# Patient Record
Sex: FEMALE | Race: WHITE | NOT HISPANIC OR LATINO
[De-identification: names, ages, dates, MRNs, and addresses within clinical notes are randomized per-mention and may not be internally consistent; named-entity substitution may affect disease eponyms.]

---

## 2017-07-08 ENCOUNTER — HOSPITAL (OUTPATIENT)
Dept: OTHER | Age: 47
End: 2017-07-08
Attending: EMERGENCY MEDICINE

## 2017-07-08 LAB
ALBUMIN SERPL-MCNC: 3.9 GM/DL (ref 3.6–5.1)
ALBUMIN/GLOB SERPL: 1.1 {RATIO} (ref 1–2.4)
ALP SERPL-CCNC: 67 UNIT/L (ref 45–117)
ALT SERPL-CCNC: 23 UNIT/L
ANALYZER ANC (IANC): NORMAL
ANION GAP SERPL CALC-SCNC: 11 MMOL/L (ref 10–20)
AST SERPL-CCNC: 17 UNIT/L
BASOPHILS # BLD: 0 THOUSAND/MCL (ref 0–0.3)
BASOPHILS NFR BLD: 1 %
BILIRUB SERPL-MCNC: 0.5 MG/DL (ref 0.2–1)
BUN SERPL-MCNC: 16 MG/DL (ref 6–20)
BUN/CREAT SERPL: 28 (ref 7–25)
CALCIUM SERPL-MCNC: 8.9 MG/DL (ref 8.4–10.2)
CHLORIDE: 106 MMOL/L (ref 98–107)
CO2 SERPL-SCNC: 29 MMOL/L (ref 21–32)
CREAT SERPL-MCNC: 0.58 MG/DL (ref 0.51–0.95)
DIFFERENTIAL METHOD BLD: NORMAL
EOSINOPHIL # BLD: 0.1 THOUSAND/MCL (ref 0.1–0.5)
EOSINOPHIL NFR BLD: 2 %
ERYTHROCYTE [DISTWIDTH] IN BLOOD: 12.9 % (ref 11–15)
ERYTHROCYTE [SEDIMENTATION RATE] IN BLOOD BY WESTERGREN METHOD: 8 MM/HR (ref 0–20)
GLOBULIN SER-MCNC: 3.5 GM/DL (ref 2–4)
GLUCOSE SERPL-MCNC: 94 MG/DL (ref 65–99)
HEMATOCRIT: 40.2 % (ref 36–46.5)
HGB BLD-MCNC: 13.8 GM/DL (ref 12–15.5)
LIPASE SERPL-CCNC: 204 UNIT/L (ref 73–393)
LYMPHOCYTES # BLD: 1.4 THOUSAND/MCL (ref 1–4.8)
LYMPHOCYTES NFR BLD: 24 %
MCH RBC QN AUTO: 29.2 PG (ref 26–34)
MCHC RBC AUTO-ENTMCNC: 34.3 GM/DL (ref 32–36.5)
MCV RBC AUTO: 85.2 FL (ref 78–100)
MONOCYTES # BLD: 0.4 THOUSAND/MCL (ref 0.3–0.9)
MONOCYTES NFR BLD: 7 %
NEUTROPHILS # BLD: 3.9 THOUSAND/MCL (ref 1.8–7.7)
NEUTROPHILS NFR BLD: 66 %
NEUTS SEG NFR BLD: NORMAL %
PERCENT NRBC: NORMAL
PLATELET # BLD: 284 THOUSAND/MCL (ref 140–450)
POTASSIUM SERPL-SCNC: 4.2 MMOL/L (ref 3.4–5.1)
PROT SERPL-MCNC: 7.4 GM/DL (ref 6.4–8.2)
RBC # BLD: 4.72 MILLION/MCL (ref 4–5.2)
SODIUM SERPL-SCNC: 142 MMOL/L (ref 135–145)
WBC # BLD: 5.8 THOUSAND/MCL (ref 4.2–11)

## 2020-01-18 ENCOUNTER — APPOINTMENT (OUTPATIENT)
Dept: GENERAL RADIOLOGY | Age: 50
DRG: 863 | End: 2020-01-18
Attending: EMERGENCY MEDICINE
Payer: COMMERCIAL

## 2020-01-18 ENCOUNTER — APPOINTMENT (OUTPATIENT)
Dept: CT IMAGING | Age: 50
DRG: 863 | End: 2020-01-18
Attending: EMERGENCY MEDICINE
Payer: COMMERCIAL

## 2020-01-18 ENCOUNTER — HOSPITAL ENCOUNTER (INPATIENT)
Age: 50
LOS: 1 days | Discharge: HOME | DRG: 863 | End: 2020-01-20
Attending: EMERGENCY MEDICINE | Admitting: SURGERY
Payer: COMMERCIAL

## 2020-01-18 DIAGNOSIS — T81.40XA POSTOPERATIVE INFECTION, UNSPECIFIED TYPE, INITIAL ENCOUNTER: Primary | ICD-10-CM

## 2020-01-18 LAB
ALBUMIN SERPL-MCNC: 2.7 G/DL (ref 3.5–5)
ALBUMIN/GLOB SERPL: 0.6 {RATIO} (ref 1.1–2.2)
ALP SERPL-CCNC: 152 U/L (ref 45–117)
ALT SERPL-CCNC: 129 U/L (ref 12–78)
ANION GAP SERPL CALC-SCNC: 6 MMOL/L (ref 5–15)
AST SERPL-CCNC: 60 U/L (ref 15–37)
BASOPHILS # BLD: 0 K/UL (ref 0–0.1)
BASOPHILS NFR BLD: 0 % (ref 0–1)
BILIRUB SERPL-MCNC: 0.8 MG/DL (ref 0.2–1)
BUN SERPL-MCNC: 8 MG/DL (ref 6–20)
BUN/CREAT SERPL: 13 (ref 12–20)
CALCIUM SERPL-MCNC: 8.5 MG/DL (ref 8.5–10.1)
CHLORIDE SERPL-SCNC: 105 MMOL/L (ref 97–108)
CO2 SERPL-SCNC: 25 MMOL/L (ref 21–32)
COMMENT, HOLDF: NORMAL
CREAT SERPL-MCNC: 0.62 MG/DL (ref 0.55–1.02)
DIFFERENTIAL METHOD BLD: ABNORMAL
EOSINOPHIL # BLD: 0.2 K/UL (ref 0–0.4)
EOSINOPHIL NFR BLD: 1 % (ref 0–7)
ERYTHROCYTE [DISTWIDTH] IN BLOOD BY AUTOMATED COUNT: 12.8 % (ref 11.5–14.5)
GLOBULIN SER CALC-MCNC: 4.4 G/DL (ref 2–4)
GLUCOSE SERPL-MCNC: 116 MG/DL (ref 65–100)
HCT VFR BLD AUTO: 35.4 % (ref 35–47)
HGB BLD-MCNC: 11.5 G/DL (ref 11.5–16)
IMM GRANULOCYTES # BLD AUTO: 0.1 K/UL (ref 0–0.04)
IMM GRANULOCYTES NFR BLD AUTO: 1 % (ref 0–0.5)
LACTATE SERPL-SCNC: 1.2 MMOL/L (ref 0.4–2)
LYMPHOCYTES # BLD: 0.9 K/UL (ref 0.8–3.5)
LYMPHOCYTES NFR BLD: 7 % (ref 12–49)
MCH RBC QN AUTO: 28.8 PG (ref 26–34)
MCHC RBC AUTO-ENTMCNC: 32.5 G/DL (ref 30–36.5)
MCV RBC AUTO: 88.5 FL (ref 80–99)
MONOCYTES # BLD: 0.5 K/UL (ref 0–1)
MONOCYTES NFR BLD: 5 % (ref 5–13)
NEUTS SEG # BLD: 10 K/UL (ref 1.8–8)
NEUTS SEG NFR BLD: 86 % (ref 32–75)
NRBC # BLD: 0 K/UL (ref 0–0.01)
NRBC BLD-RTO: 0 PER 100 WBC
PLATELET # BLD AUTO: 282 K/UL (ref 150–400)
PMV BLD AUTO: 9.7 FL (ref 8.9–12.9)
POTASSIUM SERPL-SCNC: 3.3 MMOL/L (ref 3.5–5.1)
PROT SERPL-MCNC: 7.1 G/DL (ref 6.4–8.2)
RBC # BLD AUTO: 4 M/UL (ref 3.8–5.2)
SAMPLES BEING HELD,HOLD: NORMAL
SODIUM SERPL-SCNC: 136 MMOL/L (ref 136–145)
WBC # BLD AUTO: 11.7 K/UL (ref 3.6–11)

## 2020-01-18 PROCEDURE — 93005 ELECTROCARDIOGRAM TRACING: CPT

## 2020-01-18 PROCEDURE — 85025 COMPLETE CBC W/AUTO DIFF WBC: CPT

## 2020-01-18 PROCEDURE — 74177 CT ABD & PELVIS W/CONTRAST: CPT

## 2020-01-18 PROCEDURE — 36415 COLL VENOUS BLD VENIPUNCTURE: CPT

## 2020-01-18 PROCEDURE — 80053 COMPREHEN METABOLIC PANEL: CPT

## 2020-01-18 PROCEDURE — 83605 ASSAY OF LACTIC ACID: CPT

## 2020-01-18 PROCEDURE — 87040 BLOOD CULTURE FOR BACTERIA: CPT

## 2020-01-18 PROCEDURE — 71045 X-RAY EXAM CHEST 1 VIEW: CPT

## 2020-01-18 PROCEDURE — 74011636320 HC RX REV CODE- 636/320: Performed by: RADIOLOGY

## 2020-01-18 PROCEDURE — 74011250636 HC RX REV CODE- 250/636: Performed by: EMERGENCY MEDICINE

## 2020-01-18 PROCEDURE — 74011000258 HC RX REV CODE- 258: Performed by: RADIOLOGY

## 2020-01-18 PROCEDURE — 99285 EMERGENCY DEPT VISIT HI MDM: CPT

## 2020-01-18 RX ORDER — SODIUM CHLORIDE 0.9 % (FLUSH) 0.9 %
10 SYRINGE (ML) INJECTION
Status: COMPLETED | OUTPATIENT
Start: 2020-01-18 | End: 2020-01-18

## 2020-01-18 RX ADMIN — Medication 10 ML: at 23:44

## 2020-01-18 RX ADMIN — SODIUM CHLORIDE 100 ML: 900 INJECTION, SOLUTION INTRAVENOUS at 23:44

## 2020-01-18 RX ADMIN — IOPAMIDOL 100 ML: 755 INJECTION, SOLUTION INTRAVENOUS at 23:44

## 2020-01-18 RX ADMIN — SODIUM CHLORIDE 1000 ML: 900 INJECTION, SOLUTION INTRAVENOUS at 22:58

## 2020-01-19 PROBLEM — L03.311 CELLULITIS OF ABDOMINAL WALL: Status: ACTIVE | Noted: 2020-01-19

## 2020-01-19 LAB
ATRIAL RATE: 102 BPM
CALCULATED P AXIS, ECG09: 37 DEGREES
CALCULATED R AXIS, ECG10: 49 DEGREES
CALCULATED T AXIS, ECG11: 17 DEGREES
DIAGNOSIS, 93000: NORMAL
P-R INTERVAL, ECG05: 166 MS
Q-T INTERVAL, ECG07: 340 MS
QRS DURATION, ECG06: 94 MS
QTC CALCULATION (BEZET), ECG08: 443 MS
VANCOMYCIN SERPL-MCNC: 2 UG/ML
VENTRICULAR RATE, ECG03: 102 BPM

## 2020-01-19 PROCEDURE — 36416 COLLJ CAPILLARY BLOOD SPEC: CPT

## 2020-01-19 PROCEDURE — 74011000258 HC RX REV CODE- 258: Performed by: SURGERY

## 2020-01-19 PROCEDURE — 74011250637 HC RX REV CODE- 250/637: Performed by: SURGERY

## 2020-01-19 PROCEDURE — 80202 ASSAY OF VANCOMYCIN: CPT

## 2020-01-19 PROCEDURE — 74011250637 HC RX REV CODE- 250/637: Performed by: PLASTIC SURGERY

## 2020-01-19 PROCEDURE — 87077 CULTURE AEROBIC IDENTIFY: CPT

## 2020-01-19 PROCEDURE — 87186 SC STD MICRODIL/AGAR DIL: CPT

## 2020-01-19 PROCEDURE — 77030036554

## 2020-01-19 PROCEDURE — 87015 SPECIMEN INFECT AGNT CONCNTJ: CPT

## 2020-01-19 PROCEDURE — 74011250637 HC RX REV CODE- 250/637: Performed by: EMERGENCY MEDICINE

## 2020-01-19 PROCEDURE — 87147 CULTURE TYPE IMMUNOLOGIC: CPT

## 2020-01-19 PROCEDURE — 74011250636 HC RX REV CODE- 250/636: Performed by: SURGERY

## 2020-01-19 PROCEDURE — 65410000002 HC RM PRIVATE OB

## 2020-01-19 PROCEDURE — 77030040361 HC SLV COMPR DVT MDII -B

## 2020-01-19 PROCEDURE — 87205 SMEAR GRAM STAIN: CPT

## 2020-01-19 RX ORDER — DEXTROSE, SODIUM CHLORIDE, AND POTASSIUM CHLORIDE 5; .45; .15 G/100ML; G/100ML; G/100ML
125 INJECTION INTRAVENOUS CONTINUOUS
Status: DISCONTINUED | OUTPATIENT
Start: 2020-01-19 | End: 2020-01-20 | Stop reason: HOSPADM

## 2020-01-19 RX ORDER — VANCOMYCIN 2 GRAM/500 ML IN 0.9 % SODIUM CHLORIDE INTRAVENOUS
2000 ONCE
Status: COMPLETED | OUTPATIENT
Start: 2020-01-19 | End: 2020-01-19

## 2020-01-19 RX ORDER — SODIUM CHLORIDE 0.9 % (FLUSH) 0.9 %
5-40 SYRINGE (ML) INJECTION EVERY 8 HOURS
Status: DISCONTINUED | OUTPATIENT
Start: 2020-01-19 | End: 2020-01-20 | Stop reason: HOSPADM

## 2020-01-19 RX ORDER — OXYCODONE AND ACETAMINOPHEN 5; 325 MG/1; MG/1
1 TABLET ORAL
Status: DISCONTINUED | OUTPATIENT
Start: 2020-01-19 | End: 2020-01-20 | Stop reason: HOSPADM

## 2020-01-19 RX ORDER — SODIUM CHLORIDE 0.9 % (FLUSH) 0.9 %
5-40 SYRINGE (ML) INJECTION AS NEEDED
Status: DISCONTINUED | OUTPATIENT
Start: 2020-01-19 | End: 2020-01-20 | Stop reason: HOSPADM

## 2020-01-19 RX ORDER — ACETAMINOPHEN 325 MG/1
650 TABLET ORAL
Status: DISCONTINUED | OUTPATIENT
Start: 2020-01-19 | End: 2020-01-20 | Stop reason: HOSPADM

## 2020-01-19 RX ORDER — VANCOMYCIN 1.75 GRAM/500 ML IN 0.9 % SODIUM CHLORIDE INTRAVENOUS
1750 EVERY 12 HOURS
Status: DISCONTINUED | OUTPATIENT
Start: 2020-01-19 | End: 2020-01-20

## 2020-01-19 RX ADMIN — Medication 10 ML: at 21:07

## 2020-01-19 RX ADMIN — VANCOMYCIN HYDROCHLORIDE 1750 MG: 10 INJECTION, POWDER, LYOPHILIZED, FOR SOLUTION INTRAVENOUS at 20:36

## 2020-01-19 RX ADMIN — PIPERACILLIN AND TAZOBACTAM 3.38 G: 3; .375 INJECTION, POWDER, LYOPHILIZED, FOR SOLUTION INTRAVENOUS at 02:47

## 2020-01-19 RX ADMIN — OXYCODONE HYDROCHLORIDE AND ACETAMINOPHEN 1 TABLET: 5; 325 TABLET ORAL at 21:00

## 2020-01-19 RX ADMIN — OXYCODONE HYDROCHLORIDE AND ACETAMINOPHEN 1 TABLET: 5; 325 TABLET ORAL at 13:23

## 2020-01-19 RX ADMIN — PIPERACILLIN AND TAZOBACTAM 3.38 G: 3; .375 INJECTION, POWDER, LYOPHILIZED, FOR SOLUTION INTRAVENOUS at 11:30

## 2020-01-19 RX ADMIN — ACETAMINOPHEN 650 MG: 325 TABLET ORAL at 08:15

## 2020-01-19 RX ADMIN — PIPERACILLIN AND TAZOBACTAM 3.38 G: 3; .375 INJECTION, POWDER, LYOPHILIZED, FOR SOLUTION INTRAVENOUS at 18:49

## 2020-01-19 RX ADMIN — VANCOMYCIN HYDROCHLORIDE 2000 MG: 10 INJECTION, POWDER, LYOPHILIZED, FOR SOLUTION INTRAVENOUS at 08:04

## 2020-01-19 RX ADMIN — DEXTROSE MONOHYDRATE, SODIUM CHLORIDE, AND POTASSIUM CHLORIDE 125 ML/HR: 50; 4.5; 1.49 INJECTION, SOLUTION INTRAVENOUS at 19:46

## 2020-01-19 RX ADMIN — OXYCODONE HYDROCHLORIDE AND ACETAMINOPHEN 1 TABLET: 5; 325 TABLET ORAL at 02:22

## 2020-01-19 RX ADMIN — LORATADINE, PSEUDOEPHEDRINE SULFATE 1 TABLET: 5; 120 TABLET, FILM COATED, EXTENDED RELEASE ORAL at 02:22

## 2020-01-19 RX ADMIN — DEXTROSE MONOHYDRATE, SODIUM CHLORIDE, AND POTASSIUM CHLORIDE 125 ML/HR: 50; 4.5; 1.49 INJECTION, SOLUTION INTRAVENOUS at 01:49

## 2020-01-19 RX ADMIN — Medication 20 ML: at 16:30

## 2020-01-19 NOTE — PROGRESS NOTES
1546 Bedside and Verbal shift change report given to Danna Meza RN (oncoming nurse) by Wen Morris RN (offgoing nurse). Report included the following information SBAR, Kardex, Procedure Summary, Intake/Output, MAR, Accordion, Recent Results and Med Rec Status.        18 Called ID to provide [facility] MD phone number of [phone] per patient's request.

## 2020-01-19 NOTE — PROGRESS NOTES
Problem: Falls - Risk of  Goal: *Absence of Falls  Description  Document Wil Luciano Fall Risk and appropriate interventions in the flowsheet. Outcome: Progressing Towards Goal  Note: Fall Risk Interventions:            Medication Interventions: Teach patient to arise slowly, Patient to call before getting OOB                   Problem: Patient Education: Go to Patient Education Activity  Goal: Patient/Family Education  Outcome: Progressing Towards Goal     Problem: Pain  Goal: *Control of Pain  Outcome: Progressing Towards Goal     Problem: Patient Education: Go to Patient Education Activity  Goal: Patient/Family Education  Outcome: Progressing Towards Goal     Problem: Pressure Injury - Risk of  Goal: *Prevention of pressure injury  Description  Document Aleksey Scale and appropriate interventions in the flowsheet. Outcome: Progressing Towards Goal  Note: Pressure Injury Interventions:             Activity Interventions: Increase time out of bed         Nutrition Interventions: Document food/fluid/supplement intake                     Problem: Patient Education: Go to Patient Education Activity  Goal: Patient/Family Education  Outcome: Progressing Towards Goal     Problem: General Infection Care Plan (Adult and Pediatric)  Goal: Improvement in signs and symptoms of infection  Outcome: Progressing Towards Goal  Goal: *Optimize nutritional status  Outcome: Progressing Towards Goal     Problem: Patient Education: Go to Patient Education Activity  Goal: Patient/Family Education  Outcome: Progressing Towards Goal

## 2020-01-19 NOTE — ED TRIAGE NOTES
Patient arrives ambulatory from [facility]. Patient has brachioplasty and tummy tuck on 01/08/2020. Patient had a fever of 102 \"a few days ago. \" Patient sent here by Dr. Jeannine Daniel for admission, labs, ct abd/pelvis and IV antibiotics.     + redness and swelling to lower abdominal and vaginal area

## 2020-01-19 NOTE — PROGRESS NOTES
TRANSFER - IN REPORT:    Verbal report received from Walt RN(name) on Aureliano Stokes  being received from ED(unit) for routine progression of care      Report consisted of patients Situation, Background, Assessment and   Recommendations(SBAR). Information from the following report(s) SBAR, Kardex, ED Summary, Intake/Output, MAR, Accordion, Recent Results and Med Rec Status was reviewed with the receiving nurse. Opportunity for questions and clarification was provided. Assessment completed upon patients arrival to unit and care assumed.

## 2020-01-19 NOTE — H&P
Admission H and P    CC: Cellulitis of abdominal wall [K77.334]   HPI: 44y.o. year old female with Cellulitis of abdominal wall [L03.311]. She underwent abdominoplasty and brachioplasty ten days ago. She was doing well until Thursday when she felt bad and went to [facility] in [place] where she tells me her BP was 80/50 and she was admitted to the ICU and given IV fluids and Vanc and Zosyn. Her Temp at that time was 102.5 by history. Over the next couple of days her temp came down. No imaging was done there according to the patient. Today it was noticed that her Mons area and left abdomen was more red. She contacted me by text and sent me pictures of her incision from the hospital.  I discussed her situation with her doctor in [place]. I recommended transfer here, and while this was being arranged she left AMA and came to the ER here by car. In the ER here a CT showed some stranding in the soft tissues and a small amount of subcutaneous gas just to the left of the umbilicus. WBC is 12K. Liver enzymes are elevated. Other labs are normal.  sje states she has not been drinking over the past ten days. Past medical history: History reviewed. No pertinent past medical history. Past surgical history: previous gastric sleve and failed lap band in the past.  Family history: History reviewed. No pertinent family history.    Social history:   Social History     Socioeconomic History    Marital status:      Spouse name: Not on file    Number of children: Not on file    Years of education: Not on file    Highest education level: Not on file   Occupational History    Not on file   Social Needs    Financial resource strain: Not on file    Food insecurity:     Worry: Not on file     Inability: Not on file    Transportation needs:     Medical: Not on file     Non-medical: Not on file   Tobacco Use    Smoking status: Never Smoker    Smokeless tobacco: Never Used   Substance and Sexual Activity    Alcohol use: Yes     Alcohol/week: 6.0 standard drinks     Types: 6 Glasses of wine per week     Comment: Socially    Drug use: Never    Sexual activity: Not on file   Lifestyle    Physical activity:     Days per week: Not on file     Minutes per session: Not on file    Stress: Not on file   Relationships    Social connections:     Talks on phone: Not on file     Gets together: Not on file     Attends Confucianism service: Not on file     Active member of club or organization: Not on file     Attends meetings of clubs or organizations: Not on file     Relationship status: Not on file    Intimate partner violence:     Fear of current or ex partner: Not on file     Emotionally abused: Not on file     Physically abused: Not on file     Forced sexual activity: Not on file   Other Topics Concern    Not on file   Social History Narrative    Not on file      Home Medications:   Prior to Admission medications    Not on File      Allergies: No Known Allergies   Review of systems:  Denies headache, fever, chills, weight change, congestion, sore throat, chest pain, shortness of breath, nausea, vomiting, diarrhea, constipation, abdominal pain, generalized weakness, muscle or joint pain, and rash. Physical Exam:  Vitals: Blood pressure 131/81, pulse (!) 106, temperature 99 °F (37.2 °C), resp. rate 20, SpO2 93 %. General: awake and alert, NAD. She does not look sick. She states she does not hurt. Neck: supple  Breasts:  Not examined  Cor: RRR  Lungs: clear  Abdomen: soft, non-tender, non-distended. No palpable fluid collection. No cellulitis or redness or tenderness around umbilicus or near it. No drainage from incision. Edgemaximo Pacheco is red and edematous some redness along left side of abdominoplasty incision. Sutures and drains in place in abdomen with no odor to drainage. Drainage looks serous. Extremities: no edema. Left brachiopasty is healing well. Slight redness along right brachioplasty.    Skin: see above. Impression: Cellulitis of abdominal wall [L03.311] and mons    Plan:  I plan to admit her for IV antibiotics and IV fluids. Will continue Vanc and Zosyn and get an ID consult. At present there is no indication for opening her incisions, but she bears careful watching. I am uncertain as to the cause of her increased LFTs. Will leave her drains for now.

## 2020-01-19 NOTE — ED PROVIDER NOTES
HPI     66-year-old female without significant past medical history presents to the emergency department to be admitted by Dr. Buck Boast on, plastic surgery. She states on January 8 she had a tummy tuck and bilateral arm lifts and earlobe reconstruction at the [facility] in [place]. On January 17 she presented to [facility] with 102 fever and chills. She was admitted to the ICU with a sepsis protocol due to low blood pressure and tachycardia. She was diagnosed with a possible left lower lobe pneumonia. She had not had any cough chest pain or shortness of breath. Patient states she the also thought maybe the wound on her right upper arm was infected. She has been on Zosyn and Vanc. Her last dose of Tylenol was at 8 AM.  Patient states the physicians at [facility] did not want to transfer her but she contacted Dr. Brittney Coleman who just got back into town showed him the wounds and he felt she needed to be in [place] under his care. Patient signed herself out of the hospital and drove herself here. She states her white count had gone from 11-13.4 and her temperature overnight was 100.8. She denies any pain. She denies any nausea or vomiting. She does report decreased appetite. She states she is urinating normally. History reviewed. No pertinent past medical history. History reviewed. No pertinent surgical history. History reviewed. No pertinent family history.     Social History     Socioeconomic History    Marital status:      Spouse name: Not on file    Number of children: Not on file    Years of education: Not on file    Highest education level: Not on file   Occupational History    Not on file   Social Needs    Financial resource strain: Not on file    Food insecurity:     Worry: Not on file     Inability: Not on file    Transportation needs:     Medical: Not on file     Non-medical: Not on file   Tobacco Use    Smoking status: Never Smoker    Smokeless tobacco: Never Used   Substance and Sexual Activity    Alcohol use: Yes     Alcohol/week: 6.0 standard drinks     Types: 6 Glasses of wine per week     Comment: Socially    Drug use: Never    Sexual activity: Not on file   Lifestyle    Physical activity:     Days per week: Not on file     Minutes per session: Not on file    Stress: Not on file   Relationships    Social connections:     Talks on phone: Not on file     Gets together: Not on file     Attends Synagogue service: Not on file     Active member of club or organization: Not on file     Attends meetings of clubs or organizations: Not on file     Relationship status: Not on file    Intimate partner violence:     Fear of current or ex partner: Not on file     Emotionally abused: Not on file     Physically abused: Not on file     Forced sexual activity: Not on file   Other Topics Concern    Not on file   Social History Narrative    Not on file         ALLERGIES: Patient has no known allergies. Review of Systems   Constitutional: Positive for appetite change and fever. HENT: Negative for congestion. Eyes: Negative for visual disturbance. Respiratory: Negative for cough and shortness of breath. Cardiovascular: Negative for chest pain. Gastrointestinal: Negative for abdominal pain, nausea and vomiting. Genitourinary: Negative for dysuria. Musculoskeletal: Negative for gait problem. Skin: Positive for wound. Neurological: Positive for headaches (mild started in route to ED). Psychiatric/Behavioral: Negative for dysphoric mood. Vitals:    01/18/20 2151   BP: 127/51   Pulse: (!) 119   Resp: 18   Temp: 99 °F (37.2 °C)   SpO2: 95%            Physical Exam  Constitutional:       General: She is not in acute distress. Appearance: She is well-developed. HENT:      Head: Normocephalic and atraumatic. Mouth/Throat:      Pharynx: No oropharyngeal exudate. Eyes:      General: No scleral icterus.         Right eye: No discharge. Left eye: No discharge. Pupils: Pupils are equal, round, and reactive to light. Neck:      Musculoskeletal: Normal range of motion and neck supple. Vascular: No JVD. Cardiovascular:      Rate and Rhythm: Regular rhythm. Tachycardia present. Heart sounds: Normal heart sounds. No murmur. Pulmonary:      Effort: Pulmonary effort is normal. No respiratory distress. Breath sounds: Normal breath sounds. No stridor. No wheezing or rales. Chest:      Chest wall: No tenderness. Abdominal:      General: Bowel sounds are normal. There is no distension. Palpations: Abdomen is soft. There is no mass. Tenderness: There is no tenderness. There is no guarding or rebound. Musculoskeletal: Normal range of motion. Skin:     General: Skin is warm and dry. Capillary Refill: Capillary refill takes less than 2 seconds. Findings: No rash. Comments: Bilateral upper arm incisions:   left c/d/i  Right: excoriation with some surrounding erythema, no drainage    Abdominal wounds- erythema, edema across entire wound extending into mons pubis. No drainage. Bilateral drains in tact, left drainage>right   Neurological:      Mental Status: She is oriented to person, place, and time. Psychiatric:         Behavior: Behavior normal.         Thought Content: Thought content normal.         Judgment: Judgment normal.          MDM       Procedures    Heart rate improving with fluids, good blood pressure. Lactate 1.2. Mild elevation in LFT's, bili normal.  CT with post surgical changes, no abscess. ? Hyperemia of gallbladder but patient without RUQ tenderness. Spoke with dr. Cali Dsouza with results.   He will admit and see her in the am.

## 2020-01-19 NOTE — PROGRESS NOTES
Feels better. Afebrile. Wants to eat. Heart rate down to 90s. BP OK. Voiding well. MILI drainage is scant. ID has not yet seen her. Arms and abdomen both look a little better to me with less redness and swelling. She agrees. Plan to continue Vanc and Zosyn. Will give her a regular diet and get her up. No indications for surgery now. Leave drains for now.

## 2020-01-19 NOTE — ED NOTES
0020- Informed by Nursing Supervisor that patient has a bed in 309. When attempting to place Admit orders under Dr. Corina Herman, advised he does not have admitting privileges here. Kaylee HENDERSON to reach out to nursing supervisor for orders. 5- TRANSFER - OUT REPORT:    Verbal report given to RN(name) on Steve Bowman  being transferred to (unit) for routine progression of care       Report consisted of patients Situation, Background, Assessment and   Recommendations(SBAR). Information from the following report(s) ED Summary was reviewed with the receiving nurse. Lines:   Peripheral IV 01/18/20 Right Antecubital (Active)   Site Assessment Clean, dry, & intact 1/18/2020 10:23 PM   Phlebitis Assessment 0 1/18/2020 10:23 PM   Infiltration Assessment 0 1/18/2020 10:23 PM   Dressing Status Clean, dry, & intact 1/18/2020 10:23 PM   Dressing Type Transparent 1/18/2020 10:23 PM   Hub Color/Line Status Pink;Flushed;Patent 1/18/2020 10:23 PM   Action Taken Blood drawn 1/18/2020 10:23 PM       Peripheral IV 01/18/20 Left Hand (Active)   Site Assessment Clean, dry, & intact 1/18/2020 10:24 PM   Phlebitis Assessment 0 1/18/2020 10:24 PM   Dressing Status Clean, dry, & intact 1/18/2020 10:24 PM   Dressing Type Transparent 1/18/2020 10:24 PM   Hub Color/Line Status Blue;Flushed 1/18/2020 10:24 PM   Action Taken Blood drawn 1/18/2020 10:24 PM        Opportunity for questions and clarification was provided.       Patient transported with:   Registered Nurse

## 2020-01-19 NOTE — CONSULTS
ID Consult Note   NAME:  Elliot Blackburn   :   1970   MRN:   [id number]   Date/Time:  2020 3:40 PM  Subjective:   REASON FOR CONSULT:    Pelvic cellulitis   Vaibhav Campos is a 44 y.o. with a history of lap band surgery in  as well as modified sleeve surgery in . She has lost about 90 pounds. She then had a abdominal plasty done on . She was doing well until  when she developed severe chills. She initially had low-grade fever but it went as high as 102.5. She went to [facility] and she was admitted to the ICU because her blood pressure was 80/50. She was placed on vancomycin and Zosyn. She was told that she had an infection, but she is not told where it was. She was not in any pain. She says that no imaging studies were done, but she did get blood cultures. Her nurse there noticed that the mons pubis was red. Mrs. Kat Noriega then called Dr. Noemy Partida and told her about a possible postoperative infection. She was told by Dr. Zackary Groves to come to the emergency room at [facility] and get admitted. Here she had a CT scan done which showed stranding in the abdominal wall, but no drainable abscess. He says that the redness has now decreased since she has been admitted here. We are now being asked to see her in consult. She did not have any alleviating or aggravating factors. Past medical history  Obesity    Past surgical history  Lap band   Modified gastric sleeve in   Abdominoplasty 2020    Social History     Tobacco Use    Smoking status: Never Smoker    Smokeless tobacco: Never Used   Substance Use Topics    Alcohol use: Yes     Alcohol/week: 6.0 standard drinks     Types: 6 Glasses of wine per week     Comment: Socially   She does not engage in recreational drug use    Family history  Her mother has diabetes, hypertension, heart disease and kidney disease  Her father passed away when she was young.   He had mental illness and was an alcoholic. No Known Allergies   Home Medications:  None     Hospital medications:  Current Facility-Administered Medications   Medication Dose Route Frequency    dextrose 5% - 0.45% NaCl with KCl 20 mEq/L infusion  125 mL/hr IntraVENous CONTINUOUS    acetaminophen (TYLENOL) tablet 650 mg  650 mg Oral Q4H PRN    piperacillin-tazobactam (ZOSYN) 3.375 g in 0.9% sodium chloride (MBP/ADV) 100 mL  3.375 g IntraVENous Q8H    Vancomycin Pharmacy Dosing   Other Rx Dosing/Monitoring    oxyCODONE-acetaminophen (PERCOCET) 5-325 mg per tablet 1 Tab  1 Tab Oral Q4H PRN    vancomycin (VANCOCIN) 1750 mg in  ml infusion  1,750 mg IntraVENous Q12H    saline peripheral flush soln 5-40 mL  5-40 mL InterCATHeter PRN    SALINE PERIPHERAL FLUSH Q8H soln 5-40 mL  5-40 mL InterCATHeter Q8H     REVIEW OF SYSTEMS:        Const:  negative weight loss  Eyes:   negative diplopia or visual changes, negative eye pain  ENT:   negative coryza, negative sore throat  Resp:   Positive cough  Cards:  negative for chest pain, palpitations, lower extremity edema  :  negative for frequency, dysuria and hematuria  Skin:   negative for rash and pruritus  Heme:   negative for easy bruising and gum/nose bleeding  MS:  negative for myalgias, arthralgias,   Neurolo:  He does have a slight headache. Psych:  negative for feelings of anxiety, depression         Objective:   VITALS:    Visit Vitals  BP 94/67 (BP 1 Location: Left arm, BP Patient Position: At rest) Comment (BP 1 Location): forearm   Pulse 94   Temp 98.2 °F (36.8 °C)   Resp 16   Ht 5' 7\" (1.702 m)   Wt 95.1 kg (209 lb 11.2 oz)   SpO2 98%   BMI 32.84 kg/m²     Temp (24hrs), Av.8 °F (37.1 °C), Min:98.2 °F (36.8 °C), Max:99.9 °F (37.7 °C)    PHYSICAL EXAM:   General:    Alert, cooperative, no distress, appears stated age. Head:   Normocephalic, without obvious abnormality, atraumatic. Eyes:   Conjunctivae clear, anicteric sclerae.     Nose:  Nares normal.   Throat:    Lips normal.  No Thrush  Neck:  Supple, symmetrical    no carotid bruit and no JVD. :  No CVA tenderness, no Gonzales catheter, the mons pubis is red and swollen. There is no induration. Lungs:   Clear to auscultation bilaterally. No Wheezing or Rhonchi. No rales. Heart:   Regular rate and rhythm,  no murmur, rub or gallop. Abdomen:   Soft, non-tender,not distended. Bowel sounds normal. No masses, she has bilateral MILI drains. Both drains are emmiting cloudy fluid  Extremities: Extremities normal, atraumatic, no cyanosis. No edema. No clubbing  Skin:     No rashes or lesions. Not Jaundiced  Lymph: Cervical  normal.  Neurologic: Use of her extraocular muscles, tongue midline, no facial asymmetry. Muscle strength is 5 out of 5    LAB DATA REVIEWED:    Recent Results (from the past 48 hour(s))   CBC WITH AUTOMATED DIFF    Collection Time: 01/18/20 10:02 PM   Result Value Ref Range    WBC 11.7 (H) 3.6 - 11.0 K/uL    RBC 4.00 3.80 - 5.20 M/uL    HGB 11.5 11.5 - 16.0 g/dL    HCT 35.4 35.0 - 47.0 %    MCV 88.5 80.0 - 99.0 FL    MCH 28.8 26.0 - 34.0 PG    MCHC 32.5 30.0 - 36.5 g/dL    RDW 12.8 11.5 - 14.5 %    PLATELET 249 576 - 035 K/uL    MPV 9.7 8.9 - 12.9 FL    NRBC 0.0 0  WBC    ABSOLUTE NRBC 0.00 0.00 - 0.01 K/uL    NEUTROPHILS 86 (H) 32 - 75 %    LYMPHOCYTES 7 (L) 12 - 49 %    MONOCYTES 5 5 - 13 %    EOSINOPHILS 1 0 - 7 %    BASOPHILS 0 0 - 1 %    IMMATURE GRANULOCYTES 1 (H) 0.0 - 0.5 %    ABS. NEUTROPHILS 10.0 (H) 1.8 - 8.0 K/UL    ABS. LYMPHOCYTES 0.9 0.8 - 3.5 K/UL    ABS. MONOCYTES 0.5 0.0 - 1.0 K/UL    ABS. EOSINOPHILS 0.2 0.0 - 0.4 K/UL    ABS. BASOPHILS 0.0 0.0 - 0.1 K/UL    ABS. IMM.  GRANS. 0.1 (H) 0.00 - 0.04 K/UL    DF AUTOMATED     METABOLIC PANEL, COMPREHENSIVE    Collection Time: 01/18/20 10:02 PM   Result Value Ref Range    Sodium 136 136 - 145 mmol/L    Potassium 3.3 (L) 3.5 - 5.1 mmol/L    Chloride 105 97 - 108 mmol/L    CO2 25 21 - 32 mmol/L    Anion gap 6 5 - 15 mmol/L    Glucose 116 (H) 65 - 100 mg/dL    BUN 8 6 - 20 MG/DL    Creatinine 0.62 0.55 - 1.02 MG/DL    BUN/Creatinine ratio 13 12 - 20      GFR est AA >60 >60 ml/min/1.73m2    GFR est non-AA >60 >60 ml/min/1.73m2    Calcium 8.5 8.5 - 10.1 MG/DL    Bilirubin, total 0.8 0.2 - 1.0 MG/DL    ALT (SGPT) 129 (H) 12 - 78 U/L    AST (SGOT) 60 (H) 15 - 37 U/L    Alk. phosphatase 152 (H) 45 - 117 U/L    Protein, total 7.1 6.4 - 8.2 g/dL    Albumin 2.7 (L) 3.5 - 5.0 g/dL    Globulin 4.4 (H) 2.0 - 4.0 g/dL    A-G Ratio 0.6 (L) 1.1 - 2.2     CULTURE, BLOOD, PAIRED    Collection Time: 01/18/20 10:02 PM   Result Value Ref Range    Special Requests: NO SPECIAL REQUESTS      Culture result: NO GROWTH AFTER 7 HOURS     LACTIC ACID    Collection Time: 01/18/20 10:02 PM   Result Value Ref Range    Lactic acid 1.2 0.4 - 2.0 MMOL/L   SAMPLES BEING HELD    Collection Time: 01/18/20 10:02 PM   Result Value Ref Range    SAMPLES BEING HELD 1 RED, 1 BLUE      COMMENT        Add-on orders for these samples will be processed based on acceptable specimen integrity and analyte stability, which may vary by analyte. EKG, 12 LEAD, INITIAL    Collection Time: 01/18/20 10:48 PM   Result Value Ref Range    Ventricular Rate 102 BPM    Atrial Rate 102 BPM    P-R Interval 166 ms    QRS Duration 94 ms    Q-T Interval 340 ms    QTC Calculation (Bezet) 443 ms    Calculated P Axis 37 degrees    Calculated R Axis 49 degrees    Calculated T Axis 17 degrees    Diagnosis       Sinus tachycardia  No previous ECGs available  Confirmed by [facility], M.D., Chris Johnson ([id number]) on 1/19/2020 9:26:00 AM     VANCOMYCIN, RANDOM    Collection Time: 01/19/20  6:18 AM   Result Value Ref Range    Vancomycin, random 2.0 UG/ML     Impression    #1 pelvic cellulitis status post abdominoplasty on January 8, 2020      PLAN     We will culture the drainage from the right MILI drain. It seems to be cloudy. Continue vancomycin and Zosyn at this time.     I will try to contact [facility] and see if her blood cultures grew out anything.           ___________________________________________________  ID: Gerardo Salas MD

## 2020-01-19 NOTE — PROGRESS NOTES
Bedside and Verbal shift change report given to LIANG HoffmannRN (oncoming nurse) by LIANG Kumar RN (offgoing nurse). Report included the following information SBAR, Kardex, Intake/Output, MAR and Recent Results. 3326  Dr. Kai Mcdowell here to see patient. [facility] called to ID - Dr. Drew Christensen on call today.

## 2020-01-20 VITALS
HEART RATE: 90 BPM | HEIGHT: 67 IN | SYSTOLIC BLOOD PRESSURE: 109 MMHG | RESPIRATION RATE: 16 BRPM | DIASTOLIC BLOOD PRESSURE: 71 MMHG | OXYGEN SATURATION: 93 % | BODY MASS INDEX: 32.91 KG/M2 | TEMPERATURE: 98.1 F | WEIGHT: 209.7 LBS

## 2020-01-20 LAB
ALBUMIN SERPL-MCNC: 2.4 G/DL (ref 3.5–5)
ALBUMIN/GLOB SERPL: 0.6 {RATIO} (ref 1.1–2.2)
ALP SERPL-CCNC: 126 U/L (ref 45–117)
ALT SERPL-CCNC: 75 U/L (ref 12–78)
ANION GAP SERPL CALC-SCNC: 5 MMOL/L (ref 5–15)
AST SERPL-CCNC: 25 U/L (ref 15–37)
BILIRUB DIRECT SERPL-MCNC: <0.1 MG/DL (ref 0–0.2)
BILIRUB SERPL-MCNC: 0.4 MG/DL (ref 0.2–1)
BUN SERPL-MCNC: 5 MG/DL (ref 6–20)
BUN/CREAT SERPL: 10 (ref 12–20)
CALCIUM SERPL-MCNC: 8.5 MG/DL (ref 8.5–10.1)
CHLORIDE SERPL-SCNC: 109 MMOL/L (ref 97–108)
CO2 SERPL-SCNC: 26 MMOL/L (ref 21–32)
CREAT SERPL-MCNC: 0.51 MG/DL (ref 0.55–1.02)
ERYTHROCYTE [DISTWIDTH] IN BLOOD BY AUTOMATED COUNT: 12.7 % (ref 11.5–14.5)
GLOBULIN SER CALC-MCNC: 3.7 G/DL (ref 2–4)
GLUCOSE SERPL-MCNC: 98 MG/DL (ref 65–100)
HAV IGM SER QL: NONREACTIVE
HBV CORE IGM SER QL: NONREACTIVE
HBV SURFACE AG SER QL: <0.1 INDEX
HBV SURFACE AG SER QL: NEGATIVE
HCT VFR BLD AUTO: 31.1 % (ref 35–47)
HCV AB SERPL QL IA: NONREACTIVE
HCV COMMENT,HCGAC: NORMAL
HGB BLD-MCNC: 10.3 G/DL (ref 11.5–16)
MCH RBC QN AUTO: 28.8 PG (ref 26–34)
MCHC RBC AUTO-ENTMCNC: 33.1 G/DL (ref 30–36.5)
MCV RBC AUTO: 86.9 FL (ref 80–99)
NRBC # BLD: 0 K/UL (ref 0–0.01)
NRBC BLD-RTO: 0 PER 100 WBC
PHOSPHATE SERPL-MCNC: 3.8 MG/DL (ref 2.6–4.7)
PLATELET # BLD AUTO: 271 K/UL (ref 150–400)
PMV BLD AUTO: 10 FL (ref 8.9–12.9)
POTASSIUM SERPL-SCNC: 3.4 MMOL/L (ref 3.5–5.1)
PROT SERPL-MCNC: 6.1 G/DL (ref 6.4–8.2)
RBC # BLD AUTO: 3.58 M/UL (ref 3.8–5.2)
SODIUM SERPL-SCNC: 140 MMOL/L (ref 136–145)
SP1: NORMAL
SP2: NORMAL
SP3: NORMAL
WBC # BLD AUTO: 6.4 K/UL (ref 3.6–11)

## 2020-01-20 PROCEDURE — 74011000258 HC RX REV CODE- 258: Performed by: INTERNAL MEDICINE

## 2020-01-20 PROCEDURE — 74011250636 HC RX REV CODE- 250/636: Performed by: INTERNAL MEDICINE

## 2020-01-20 PROCEDURE — 82248 BILIRUBIN DIRECT: CPT

## 2020-01-20 PROCEDURE — 84100 ASSAY OF PHOSPHORUS: CPT

## 2020-01-20 PROCEDURE — 36415 COLL VENOUS BLD VENIPUNCTURE: CPT

## 2020-01-20 PROCEDURE — 74011250637 HC RX REV CODE- 250/637: Performed by: PLASTIC SURGERY

## 2020-01-20 PROCEDURE — 74011250637 HC RX REV CODE- 250/637: Performed by: SURGERY

## 2020-01-20 PROCEDURE — 80053 COMPREHEN METABOLIC PANEL: CPT

## 2020-01-20 PROCEDURE — 85027 COMPLETE CBC AUTOMATED: CPT

## 2020-01-20 PROCEDURE — 74011000258 HC RX REV CODE- 258: Performed by: SURGERY

## 2020-01-20 PROCEDURE — 80074 ACUTE HEPATITIS PANEL: CPT

## 2020-01-20 PROCEDURE — 74011250636 HC RX REV CODE- 250/636: Performed by: SURGERY

## 2020-01-20 RX ORDER — IBUPROFEN 200 MG
200-400 TABLET ORAL
Status: DISCONTINUED | OUTPATIENT
Start: 2020-01-20 | End: 2020-01-20 | Stop reason: HOSPADM

## 2020-01-20 RX ORDER — VANCOMYCIN HYDROCHLORIDE
1250 EVERY 8 HOURS
Status: DISCONTINUED | OUTPATIENT
Start: 2020-01-20 | End: 2020-01-20

## 2020-01-20 RX ADMIN — IBUPROFEN 400 MG: 200 TABLET, FILM COATED ORAL at 09:49

## 2020-01-20 RX ADMIN — PIPERACILLIN AND TAZOBACTAM 3.38 G: 3; .375 INJECTION, POWDER, LYOPHILIZED, FOR SOLUTION INTRAVENOUS at 03:17

## 2020-01-20 RX ADMIN — VANCOMYCIN HYDROCHLORIDE 1750 MG: 10 INJECTION, POWDER, LYOPHILIZED, FOR SOLUTION INTRAVENOUS at 08:09

## 2020-01-20 RX ADMIN — Medication 10 ML: at 08:14

## 2020-01-20 RX ADMIN — LORATADINE, PSEUDOEPHEDRINE SULFATE 1 TABLET: 5; 120 TABLET, FILM COATED, EXTENDED RELEASE ORAL at 12:45

## 2020-01-20 RX ADMIN — PIPERACILLIN AND TAZOBACTAM 3.38 G: 3; .375 INJECTION, POWDER, LYOPHILIZED, FOR SOLUTION INTRAVENOUS at 11:00

## 2020-01-20 RX ADMIN — DAPTOMYCIN 600 MG: 500 INJECTION, POWDER, LYOPHILIZED, FOR SOLUTION INTRAVENOUS at 11:49

## 2020-01-20 RX ADMIN — OXYCODONE HYDROCHLORIDE AND ACETAMINOPHEN 1 TABLET: 5; 325 TABLET ORAL at 05:55

## 2020-01-20 RX ADMIN — ERTAPENEM SODIUM 1 G: 1 INJECTION, POWDER, LYOPHILIZED, FOR SOLUTION INTRAMUSCULAR; INTRAVENOUS at 12:46

## 2020-01-20 RX ADMIN — DEXTROSE MONOHYDRATE, SODIUM CHLORIDE, AND POTASSIUM CHLORIDE 125 ML/HR: 50; 4.5; 1.49 INJECTION, SOLUTION INTRAVENOUS at 08:10

## 2020-01-20 NOTE — PROGRESS NOTES
ID Progress Note  2020    Subjective:     Afebrile. Feels better. No dysuria, dyspnea, abdominal pain. The redness on the mons has decreased. ROS: no seizures, anaphylaxis or hematemesis     Objective:     Vitals:   Visit Vitals  /71   Pulse 90   Temp 98.1 °F (36.7 °C)   Resp 16   Ht 5' 7\" (1.702 m)   Wt 95.1 kg (209 lb 11.2 oz)   SpO2 93%   BMI 32.84 kg/m²        Tmax:  Temp (24hrs), Av.2 °F (36.8 °C), Min:98.1 °F (36.7 °C), Max:98.5 °F (36.9 °C)      Exam:    Not in distress   Eyes: pink conjunctivae, anicteric sclerae   Lungs: clear to auscultation, no rales, wheezes or rhonchi   Heart: s1, s2, no murmurs, rubs or clicks    Abdomen: soft, nontender, no guarding or reboudn  : there is no erythema over the mons. There is no tenderness. Extremities: knees not warm or tender  Speech fluent     Labs:   Lab Results   Component Value Date/Time    WBC 6.4 2020 05:00 AM    HGB 10.3 (L) 2020 05:00 AM    HCT 31.1 (L) 2020 05:00 AM    PLATELET 600  05:00 AM    MCV 86.9 2020 05:00 AM     Lab Results   Component Value Date/Time    Sodium 140 2020 05:00 AM    Potassium 3.4 (L) 2020 05:00 AM    Chloride 109 (H) 2020 05:00 AM    CO2 26 2020 05:00 AM    Anion gap 5 2020 05:00 AM    Glucose 98 2020 05:00 AM    BUN 5 (L) 2020 05:00 AM    Creatinine 0.51 (L) 2020 05:00 AM    BUN/Creatinine ratio 10 (L) 2020 05:00 AM    GFR est AA >60 2020 05:00 AM    GFR est non-AA >60 2020 05:00 AM    Calcium 8.5 2020 05:00 AM    Bilirubin, total 0.4 2020 05:00 AM    AST (SGOT) 25 2020 05:00 AM    Alk.  phosphatase 126 (H) 2020 05:00 AM    Protein, total 6.1 (L) 2020 05:00 AM    Albumin 2.4 (L) 2020 05:00 AM    Globulin 3.7 2020 05:00 AM    A-G Ratio 0.6 (L) 2020 05:00 AM    ALT (SGPT) 75 2020 05:00 AM             Assessment:       #1 pelvic cellulitis status post abdominoplasty on January 8, 2020               Recommendations:     I called [facility] this morning. Blood cultures are negative. Urine culture is negative. MRSA a swab is negative as well. I will give her a dose of daptomycin and ertapenem  so that she will get another 24 hours of intravenous antibiotics. I will prescribe Bactrim and Augmentin for her pending her culture results. I will follow these up. I will give a 10-day course. I will call these to [facility] on [address] .       Keith Gambino MD

## 2020-01-20 NOTE — PROGRESS NOTES
Day #2 of Vancomycin  Indication:  Sepsis secondary to abdominal wall/pelvic cellulitis s/p abdominoplasty and brachioplasty on 20. Transferred from [facility] in [place] where she has been on Vanc + Zosyn. Current regimen:  1750 Q12  Abx regimen:  Vancomycin + Zosyn  ID Following ?: YES  Concomitant nephrotoxic drugs (requires more frequent monitoring): NSAIDs  Frequency of BMP?: daily    Recent Labs     20  0500 20  2202   WBC 6.4 11.7*   CREA 0.51* 0.62   BUN 5* 8     Est CrCl: >100 ml/min; UO: 0.8 ml/kg/hr  Temp (24hrs), Av.2 °F (36.8 °C), Min:98.1 °F (36.7 °C), Max:98.5 °F (36.9 °C)    Cultures:    blood - NGTD   abdominal fluid - 4+ GRAM POSITIVE COCCI IN CLUSTERS     Goal trough = 10 - 15 mcg/mL    Recent trough history (date/time/level/dose/action taken):      Plan: Change to vancomycin 1250 mg Q 8 hours to target trough of 10-15. Pharmacy to follow patient daily and order levels / make dose adjustments as appropriate.

## 2020-01-20 NOTE — DISCHARGE INSTRUCTIONS
Jony Wade M.D., F.A.C.S. Klaus Valencia M.D., F.A.C.S.,  Juana Segovia III, M.D., F.A.C.S. Bashir Kahn M.D.,  Nanda Serrano M.D. POSTOPERATIVE INSTRUCTIONS    ABDOMINOPLASTY    1. You must have a responsible adult with you for the first 24 hours after surgery. 2. Please wear loose, comfortable clothing the day of surgery. Avoid clothing that goes over your head. 3. You may start clear liquids once you have arrived home and continue until any nausea has subsided. Then you may advance to a normal diet. Greasy and spicy foods are not advised. 4. You may be up for meals or go to the bathroom with assistance. You should get up every two hours while awake and walk to improve circulation. Keep your TOMI hose on until you are moving about normally, at least 48 hours. 5. For comfort while sleeping, we suggest sleeping with 2-3 pillows under your back and head, and 2-3 pillows under your knees. 6. You may apply ice to the incision for the first 24 hours. 7. You may change your dressing if wet or soiled. Call if dressings are blood soaked. 8. If you have a drain, follow the drain care instructions. You must call for an appointment to have drain removed when drainage is less than _____cc in a 24 hour period. 9. You may shower ____ hours after surgery. If you have a drain, wrap the string through the drain reservoir and hand it around your neck. Replace a light dressing and the binder afterward. 10. Wear your garment when out of bed for the first two weeks. You may wash and dry and re-wear the garment. 11. No heavy lifting or strenuous activities until you are seen back in the office. No lifting over 10 pounds for a total of 4 weeks. 12. You will need to return to the office in 2 weeks for your checkup. You will be instructed on your activity level at that time. Your follow up appointment is scheduled for _________________.  You may return to work in ____________. You may resume driving in _____________________ when no longer on pain medication. 13. You will feel tight and may not be able to stand up straight for several weeks. This is normal and is due to the muscles being sutured. 14. You will have discomfort for several days. Take your prescription medication as directed. You have been prescribed _____________________ . Do not use any medication containing Aspirin or Ibuprofen for 2 weeks. You may take non aspirin or non ibuprofen headache medication such as Tylenol Extra Strength. You may resume your daily medications. 15. Do not drive a car or operate machinery while you are taking pain medication. Do not drink alcohol or take tranquilizers or sleeping pills while on pain medication. 16. If you have increasing pain and swelling on one side or the other, with the feeling of tightness, call the office at [phone]. 17. Please call the office at [phone] if you have any questions after your surgery. 18. COMPLICATIONS    Call the office at [phone] if any of the following occur:  Fever over 101° taken by mouth or over 100° taken rectally  Pain not relieved by medication prescribed  Blood soaked dressing (small amounts of oozing may be normal)  Unable to urinate or completely empty your bladder  Nausea and vomiting lasting longer than 24 hours  Numbness, tingling or cold fingers or toes  Swelling around incision  Increasing and progressive drainage from incision or exam site  Increased redness, warmth and hardness around the exam site or incision  If you experience shortness of breath, chest pain or swelling of legs or feet, go immediately to the emergency room    19. IF YOU HAVE NEED OF IMMEDIATE ATTENTION, GO TO [address].      I acknowledge receipt of the above discharge instructions:    Patient/Guardian Signature _______________________________________ Date___________________    Nita Montelongo Signature_______________________________________________ Date___________________                Madd[facility] Goss CARE - HOME CARE INSTRUCTIONS      You are going home with a drain called a DavidAllen (sometimes called a J. P.). You will need to empty and measure the drainage during the first few days of recovery. In the picture below, # 1 indicates where your drain is connected to the reservoir. You will not need to work with this part of the drain. See the instructions below for references to # 3 and #4. Empty the drain whenever it is close to full or at least 3 times per day    · Wash your hands prior to emptying the drain  · Un-pin the drainage reservoir (the lined container) from your   clothing    · Hold the reservoir with the emptying port at the top (#3)  · Remove the drainage plug (#4) from the emptying port (#3)  · Estimate the amount of drainage by the gradation   marks on the side of the reservoir  · Record the drainage amount on the Drainage Record  on the reverse side of this form  · Empty the drainage into the toilet  · Squeeze the reservoir and replug it (recharge)  · Remember, every time the drainage reservoir is fully expanded it is considered uncharged and it should be emptied, squeezed and replugged (recharged)     To prevent infection, avoid touching the end of the emptying port (#3) and the drainage plug (#4) with your hands (see diagram). For the drain to work it needs to be in the [facility] position. Keep the area around the tube dry and covered with a dressing. The color and amount of drainage may change. When you return for your post operative visit please bring the Record with you.   When the drainage is less than 30 - 40 cc in a 24-hour period OR when the drains     have been in for 2 weeks, please call the office ([phone]) to schedule removal.  If you have any questions or concerns about your drain or the above instructions please call our office at [phone].     [facility]  [facility]    Patient Name:  __________________________________________________    Shana Drown # 1    Date   Amount   Amount   Amount Daily Total Amount                                                                                                           Drain # 2    Date   Amount   Amount   Amount Daily Total Amount

## 2020-01-20 NOTE — PROGRESS NOTES
Still feels well. Wants to go home. Discussed situation with antibiotics with Dr. Abimbola Gotti. He is going to give her a couple of longer acting IV antibiotics and plan to send her home on Bactrim and Augmentin. I removed her right MILI drain today. Will leave the left MILI. See me Thursday for suture removal and wound check. Usual MILI and post op care until then. 3/2/20    This is the response to the coding query related to this admission. The cellulitis being treated developed after the patients previous abdominal surgery and was most likely related to the surgery.

## 2020-01-20 NOTE — PROGRESS NOTES
Bedside and Verbal shift change report given to Genet Obregon RN (oncoming nurse) by Benrabe De Santiago (offgoing nurse). Report included the following information SBAR, Kardex, Intake/Output, MAR and Recent Results. 3811: MD paged per pt request for new medication for headache    0836: pt ambulating in hallway    1125: Dr. Linda Isidro and Dr Alma Ivy at bedside. Pt ok for discharge. Right MILI drain removed. 1303: Lab called with wound culture result. Dr Alma Ivy notified. No new orders. Pt still ok for discharge. 1353: Discharge medications reviewed with patient and spouse and appropriate educational materials and side effects teaching were provided. I have reviewed discharge instructions with the patient and spouse. The patient and spouse verbalized understanding.       1418: pt left via wheelchair to discharge lot

## 2020-01-20 NOTE — PROGRESS NOTES
Feels good. Wants to go home. HD and urine flow are good. MILI drainage has increased some and Dr. Brian Shetty cultured the right MILI yesterday. Gm Stain showed Gm positive Cocci in clusters. Labs good this AM. LFTs have dropped. Only elevated number now is Alk phos. WBC is normal at 6.3    Exam shows decreased cellulitis of mons. I think she will probably be able to go home tomorrow on po antibiotics. I encouraged her to stay one more day for another day of IV antibiotics until we get  Culture results. Will leave drains for now.

## 2020-01-20 NOTE — PROGRESS NOTES
.Bedside shift change report given to Florencio Betancourt (oncoming nurse) by Nancy Alarcon RN (offgoing nurse). Report included the following information SBAR, Kardex, Procedure Summary, Intake/Output, MAR, Accordion and Recent Results.

## 2020-01-21 LAB
BACTERIA SPEC CULT: ABNORMAL
GRAM STN SPEC: ABNORMAL
GRAM STN SPEC: ABNORMAL
SERVICE CMNT-IMP: ABNORMAL

## 2020-01-21 NOTE — DISCHARGE SUMMARY
[facility] [address] [address] SUMMARY    Name:  Santhosh Murphy  MR#:  [id number]  :  1970  ACCOUNT #:  [de-identified]  ADMIT DATE:  2020  DISCHARGE DATE:  2020      PRIMARY DISCHARGE DIAGNOSIS:  For this admission, cellulitis of the mons pubis and lower abdomen. OTHER DISCHARGE DIAGNOSES:  History of gastric sleeve and massive weight loss. DISCHARGE MEDICATIONS:  The patient is discharged home on Bactrim and Augmentin. FOLLOWUP:  She is to see Dr. Ashley Ceballos in 3 days. WOUND CARE:  The patient was instructed to wear her abdominal binder and it was okay to shower and keep pads over her incisions. She is to continue her usual MILI care with the remaining left MILI. DISCHARGE SUMMARY:  This patient underwent an abdominoplasty and brachioplasty 10 days prior to admission. Two days prior to admission, she began to have shaking chills and spiked a fever to 102.5. She had gone to [facility] near where she lives and had been admitted there. They had given her IV vancomycin and Zosyn by report. On the evening prior to admission, it was noted that her mons was extremely red and swollen. She contacted me by text message and sent me a picture of her mons and I recommended that she come to [facility] for admission, imaging, and further treatment. She therefore did that and came to the emergency room late in the evening on . In the emergency room, a CT scan was done, which did not show any fluid collection in her abdomen. She had a low-grade temperature and her white count was about 12,000. She was admitted to the hospital and was placed on vancomycin and Zosyn. During her 36-hour hospital stay, she was afebrile and gradually began to feel much better. The redness over her mons was gone at the time of discharge. Blood cultures had not grown anything at the time of discharge.   Cultures of the Konstantin-Allen drain, which had been done, were pending; however, Gram stain of this showed gram-positive cocci in clusters. Dr. Walker Brooke of Infectious Disease saw her with me. At discharge, she was ambulating without difficulty, tolerating a regular diet, and was afebrile and her white count had dropped to 6000. She did have elevated liver function tests, which were decreasing at the time of discharge. Her followup and wound care and antibiotics at discharge are as noted above.         Rui Rodriguez MD      [id number]  D:  01/20/2020 11:22  T:  01/20/2020 23:39  JOB #:  [id number]  CC:  Juan Antonio Zimmer MD

## 2020-01-23 LAB
BACTERIA SPEC CULT: NORMAL
SERVICE CMNT-IMP: NORMAL

## 2020-02-27 NOTE — CDMP QUERY
RETRO QUERY Pt admitted with cellulitis /Pt noted to have recent abdominal surgery If possible, please document in the progress notes and d/c summary if you are evaluating and / or treating any of the following: ? Abdominal Cellulitis related/due to recent abdominoplasty ? Abdominal Cellulitis not related/ nor due to recent abdominoplasty ? Other, please specify ? Clinically unable to determine The medical record reflects the following: 
  Risk Factors: abdominal cellulitis/recent surgery Clinical Indicators:  
he states on January 8 she had a tummy tuck and bilateral arm lifts and earlobe reconstruction at the [facility] in [place]. On January 17 she presented to [facility] with 102 fever and chills. She was admitted to the ICU with a sepsis protocol due to low blood pressure and tachycardia H/P 
44y.o. year old female with Cellulitis of abdominal wall [L03.311]. She underwent abdominoplasty and brachioplasty ten days ago. DCS Two days prior to admission, she began to have shaking chills and spiked a fever to 102.5. She had gone to [facility] near Treatment:Vancomycin IV, Zosyn  IV and ID consult Thank you, 
       Shana Lara [facility], [address]

## 2022-03-19 PROBLEM — L03.311 CELLULITIS OF ABDOMINAL WALL: Status: ACTIVE | Noted: 2020-01-19
